# Patient Record
Sex: FEMALE | Race: OTHER | HISPANIC OR LATINO
[De-identification: names, ages, dates, MRNs, and addresses within clinical notes are randomized per-mention and may not be internally consistent; named-entity substitution may affect disease eponyms.]

---

## 2017-12-05 ENCOUNTER — OTHER (OUTPATIENT)
Age: 82
End: 2017-12-05

## 2017-12-12 ENCOUNTER — APPOINTMENT (OUTPATIENT)
Dept: ORTHOPEDIC SURGERY | Facility: CLINIC | Age: 82
End: 2017-12-12
Payer: MEDICARE

## 2017-12-12 VITALS
DIASTOLIC BLOOD PRESSURE: 75 MMHG | HEIGHT: 61 IN | WEIGHT: 148 LBS | BODY MASS INDEX: 27.94 KG/M2 | HEART RATE: 71 BPM | SYSTOLIC BLOOD PRESSURE: 144 MMHG

## 2017-12-12 DIAGNOSIS — Z78.9 OTHER SPECIFIED HEALTH STATUS: ICD-10-CM

## 2017-12-12 DIAGNOSIS — Z86.79 PERSONAL HISTORY OF OTHER DISEASES OF THE CIRCULATORY SYSTEM: ICD-10-CM

## 2017-12-12 PROCEDURE — 73564 X-RAY EXAM KNEE 4 OR MORE: CPT | Mod: 50

## 2017-12-12 PROCEDURE — 20610 DRAIN/INJ JOINT/BURSA W/O US: CPT | Mod: 50

## 2017-12-12 PROCEDURE — 99203 OFFICE O/P NEW LOW 30 MIN: CPT | Mod: 25

## 2018-03-14 ENCOUNTER — OTHER (OUTPATIENT)
Age: 83
End: 2018-03-14

## 2018-03-20 ENCOUNTER — APPOINTMENT (OUTPATIENT)
Dept: ORTHOPEDIC SURGERY | Facility: CLINIC | Age: 83
End: 2018-03-20
Payer: MEDICARE

## 2018-03-20 VITALS
HEART RATE: 85 BPM | DIASTOLIC BLOOD PRESSURE: 74 MMHG | BODY MASS INDEX: 27.94 KG/M2 | WEIGHT: 148 LBS | SYSTOLIC BLOOD PRESSURE: 130 MMHG | HEIGHT: 61 IN

## 2018-03-20 PROCEDURE — 73564 X-RAY EXAM KNEE 4 OR MORE: CPT | Mod: 50

## 2018-03-20 PROCEDURE — 99213 OFFICE O/P EST LOW 20 MIN: CPT

## 2018-05-22 ENCOUNTER — APPOINTMENT (OUTPATIENT)
Dept: ORTHOPEDIC SURGERY | Facility: CLINIC | Age: 83
End: 2018-05-22
Payer: MEDICARE

## 2018-05-22 VITALS
HEART RATE: 69 BPM | HEIGHT: 61 IN | SYSTOLIC BLOOD PRESSURE: 110 MMHG | BODY MASS INDEX: 27.94 KG/M2 | WEIGHT: 148 LBS | DIASTOLIC BLOOD PRESSURE: 59 MMHG

## 2018-05-22 PROCEDURE — 99213 OFFICE O/P EST LOW 20 MIN: CPT | Mod: 25

## 2018-05-22 PROCEDURE — 20610 DRAIN/INJ JOINT/BURSA W/O US: CPT | Mod: 50

## 2018-05-29 ENCOUNTER — APPOINTMENT (OUTPATIENT)
Dept: ORTHOPEDIC SURGERY | Facility: CLINIC | Age: 83
End: 2018-05-29
Payer: MEDICARE

## 2018-05-29 VITALS
SYSTOLIC BLOOD PRESSURE: 154 MMHG | HEART RATE: 67 BPM | WEIGHT: 148 LBS | HEIGHT: 61 IN | DIASTOLIC BLOOD PRESSURE: 80 MMHG | BODY MASS INDEX: 27.94 KG/M2

## 2018-05-29 PROCEDURE — 20610 DRAIN/INJ JOINT/BURSA W/O US: CPT | Mod: 50

## 2018-06-04 ENCOUNTER — APPOINTMENT (OUTPATIENT)
Dept: ORTHOPEDIC SURGERY | Facility: CLINIC | Age: 83
End: 2018-06-04
Payer: MEDICARE

## 2018-06-04 VITALS
DIASTOLIC BLOOD PRESSURE: 81 MMHG | SYSTOLIC BLOOD PRESSURE: 149 MMHG | BODY MASS INDEX: 27.94 KG/M2 | HEART RATE: 79 BPM | WEIGHT: 148 LBS | HEIGHT: 61 IN

## 2018-06-04 PROCEDURE — 20610 DRAIN/INJ JOINT/BURSA W/O US: CPT | Mod: 50

## 2018-07-17 ENCOUNTER — OTHER (OUTPATIENT)
Age: 83
End: 2018-07-17

## 2018-07-22 PROBLEM — Z78.9 ALCOHOL USE: Status: INACTIVE | Noted: 2017-12-12

## 2018-07-23 ENCOUNTER — APPOINTMENT (OUTPATIENT)
Dept: ORTHOPEDIC SURGERY | Facility: CLINIC | Age: 83
End: 2018-07-23
Payer: MEDICARE

## 2018-07-23 VITALS
WEIGHT: 148 LBS | HEIGHT: 61 IN | DIASTOLIC BLOOD PRESSURE: 79 MMHG | BODY MASS INDEX: 27.94 KG/M2 | HEART RATE: 74 BPM | SYSTOLIC BLOOD PRESSURE: 155 MMHG

## 2018-07-23 PROCEDURE — 73564 X-RAY EXAM KNEE 4 OR MORE: CPT | Mod: 50

## 2018-07-23 PROCEDURE — 99213 OFFICE O/P EST LOW 20 MIN: CPT | Mod: 25

## 2018-07-23 PROCEDURE — 20610 DRAIN/INJ JOINT/BURSA W/O US: CPT | Mod: RT

## 2018-07-23 RX ORDER — HYALURONATE SODIUM 20 MG/2 ML
20 SYRINGE (ML) INTRAARTICULAR
Qty: 12 | Refills: 0 | Status: DISCONTINUED | OUTPATIENT
Start: 2018-03-20 | End: 2018-07-23

## 2018-11-21 ENCOUNTER — APPOINTMENT (OUTPATIENT)
Dept: ORTHOPEDIC SURGERY | Facility: CLINIC | Age: 83
End: 2018-11-21
Payer: MEDICARE

## 2018-11-21 DIAGNOSIS — M25.562 PAIN IN RIGHT KNEE: ICD-10-CM

## 2018-11-21 DIAGNOSIS — M17.11 UNILATERAL PRIMARY OSTEOARTHRITIS, RIGHT KNEE: ICD-10-CM

## 2018-11-21 DIAGNOSIS — M17.12 UNILATERAL PRIMARY OSTEOARTHRITIS, LEFT KNEE: ICD-10-CM

## 2018-11-21 DIAGNOSIS — M25.561 PAIN IN RIGHT KNEE: ICD-10-CM

## 2018-11-21 PROCEDURE — 73562 X-RAY EXAM OF KNEE 3: CPT | Mod: RT

## 2018-11-21 PROCEDURE — 99213 OFFICE O/P EST LOW 20 MIN: CPT | Mod: 25

## 2018-11-21 PROCEDURE — 99214 OFFICE O/P EST MOD 30 MIN: CPT | Mod: 25

## 2018-11-21 PROCEDURE — 20610 DRAIN/INJ JOINT/BURSA W/O US: CPT | Mod: RT

## 2019-03-21 ENCOUNTER — APPOINTMENT (OUTPATIENT)
Dept: CARDIOLOGY | Facility: CLINIC | Age: 84
End: 2019-03-21
Payer: MEDICARE

## 2019-03-21 VITALS
OXYGEN SATURATION: 92 % | RESPIRATION RATE: 16 BRPM | BODY MASS INDEX: 28.89 KG/M2 | DIASTOLIC BLOOD PRESSURE: 55 MMHG | HEIGHT: 62 IN | WEIGHT: 157 LBS | HEART RATE: 16 BPM | SYSTOLIC BLOOD PRESSURE: 111 MMHG

## 2019-03-21 PROCEDURE — 93000 ELECTROCARDIOGRAM COMPLETE: CPT

## 2019-03-21 PROCEDURE — 99214 OFFICE O/P EST MOD 30 MIN: CPT

## 2019-03-21 RX ORDER — ASPIRIN AND DIPYRIDAMOLE 25; 200 MG/1; MG/1
CAPSULE, EXTENDED RELEASE ORAL
Refills: 0 | Status: DISCONTINUED | COMMUNITY
End: 2019-03-21

## 2019-03-21 RX ORDER — MEMANTINE HYDROCHLORIDE 5 MG/1
TABLET ORAL
Refills: 0 | Status: DISCONTINUED | COMMUNITY
End: 2019-03-21

## 2019-04-01 NOTE — DISCUSSION/SUMMARY
[FreeTextEntry1] : Case and plan discussed with Dr. Vaughan.\par \par 1 - Status post [facility] ER visit for acute diastolic HF:  presently without complaints of further shortness of breath.  Will continue with current diuretic therapy.  Educated on the importance of following a strict low sodium diet.  Labs prior to next visit.\par \par 2 - Hypertension:  Blood pressure well controlled on current medications.\par \par 3 - Will schedule Mrs. Mcnamara for an echocardiogram and pharmacologic nuclear stress test given her recent CHF exacerbation.\par \par 4 - Follow up with Dr. Taylor once testing is completed.

## 2019-04-01 NOTE — PHYSICAL EXAM
[General Appearance - Well Developed] : well developed [Normal Conjunctiva] : the conjunctiva exhibited no abnormalities [Normal Oral Mucosa] : normal oral mucosa [] : no respiratory distress [Auscultation Breath Sounds / Voice Sounds] : lungs were clear to auscultation bilaterally [Heart Rate And Rhythm] : heart rate and rhythm were normal [Heart Sounds] : normal S1 and S2 [Bowel Sounds] : normal bowel sounds [Skin Color & Pigmentation] : normal skin color and pigmentation [Skin Turgor] : normal skin turgor [Oriented To Time, Place, And Person] : oriented to person, place, and time [Affect] : the affect was normal [Mood] : the mood was normal [FreeTextEntry1] : Trace to 1+ bilateral LE edema

## 2019-04-01 NOTE — HISTORY OF PRESENT ILLNESS
[FreeTextEntry1] : Mrs. Mcnamara presents today for follow up status post a recent [facility] ER visit for complaints of increasing shortness of breath.  Patient with acute diastolic CHF most likely from non-compliance with her low sodium intake.  While at [facility] she was treated with IV diuretics and was ruled out for an MI and PE.  LE duplex was also negative for DVT.  Presently she is feeling well.  Denies any complaints of chest pain, shortness of breath, palpitations, lightheadedness or syncope.  She does complain of extreme fatigue.  Her family states that she frequently nods off and sleeps all day.  Admits she is unsteady on her feet.  Recenly lost her balance and fell at home.  She also has a recent onset of hand tremors.  She is scheduled to see Dr. Echevarria (neuro) next week.

## 2019-04-01 NOTE — REASON FOR VISIT
[FreeTextEntry1] : The patient is an 88 y.o.  female who presents today for follow up status post a recent [facility] ER visit.

## 2019-04-03 ENCOUNTER — APPOINTMENT (OUTPATIENT)
Dept: CARDIOLOGY | Facility: CLINIC | Age: 84
End: 2019-04-03
Payer: MEDICARE

## 2019-04-03 PROCEDURE — 93306 TTE W/DOPPLER COMPLETE: CPT

## 2019-04-15 ENCOUNTER — APPOINTMENT (OUTPATIENT)
Dept: CARDIOLOGY | Facility: CLINIC | Age: 84
End: 2019-04-15
Payer: MEDICARE

## 2019-04-15 PROCEDURE — 93015 CV STRESS TEST SUPVJ I&R: CPT

## 2019-04-15 PROCEDURE — A9500: CPT

## 2019-04-15 PROCEDURE — 78452 HT MUSCLE IMAGE SPECT MULT: CPT

## 2019-04-15 RX ORDER — REGADENOSON 0.08 MG/ML
0.4 INJECTION, SOLUTION INTRAVENOUS
Qty: 4 | Refills: 0 | Status: COMPLETED | OUTPATIENT
Start: 2019-04-15

## 2019-04-15 RX ORDER — AMINOPHYLLINE 25 MG/ML
25 INJECTION, SOLUTION INTRAVENOUS
Qty: 0 | Refills: 0 | Status: COMPLETED | OUTPATIENT
Start: 2019-04-15

## 2019-04-15 RX ADMIN — REGADENOSON 0 MG/5ML: 0.08 INJECTION, SOLUTION INTRAVENOUS at 00:00

## 2019-04-15 RX ADMIN — AMINOPHYLLINE 0 MG/ML: 25 INJECTION, SOLUTION INTRAVENOUS at 00:00

## 2019-05-03 RX ORDER — KIT FOR THE PREPARATION OF TECHNETIUM TC99M SESTAMIBI 1 MG/5ML
INJECTION, POWDER, LYOPHILIZED, FOR SOLUTION PARENTERAL
Refills: 0 | Status: COMPLETED | OUTPATIENT
Start: 2019-05-03

## 2019-05-03 RX ADMIN — KIT FOR THE PREPARATION OF TECHNETIUM TC99M SESTAMIBI 0: 1 INJECTION, POWDER, LYOPHILIZED, FOR SOLUTION PARENTERAL at 00:00

## 2019-05-13 ENCOUNTER — NON-APPOINTMENT (OUTPATIENT)
Age: 84
End: 2019-05-13

## 2019-05-13 ENCOUNTER — APPOINTMENT (OUTPATIENT)
Dept: CARDIOLOGY | Facility: CLINIC | Age: 84
End: 2019-05-13
Payer: MEDICARE

## 2019-05-13 VITALS
HEART RATE: 62 BPM | OXYGEN SATURATION: 90 % | DIASTOLIC BLOOD PRESSURE: 70 MMHG | RESPIRATION RATE: 14 BRPM | BODY MASS INDEX: 29.08 KG/M2 | SYSTOLIC BLOOD PRESSURE: 110 MMHG | WEIGHT: 158 LBS | HEIGHT: 62 IN

## 2019-05-13 DIAGNOSIS — R10.9 UNSPECIFIED ABDOMINAL PAIN: ICD-10-CM

## 2019-05-13 PROCEDURE — 93000 ELECTROCARDIOGRAM COMPLETE: CPT

## 2019-05-13 PROCEDURE — 99214 OFFICE O/P EST MOD 30 MIN: CPT

## 2019-05-13 RX ORDER — RAMIPRIL 5 MG/1
5 CAPSULE ORAL
Qty: 90 | Refills: 0 | Status: DISCONTINUED | COMMUNITY
End: 2019-05-13

## 2019-05-14 ENCOUNTER — RX RENEWAL (OUTPATIENT)
Age: 84
End: 2019-05-14

## 2019-05-14 RX ORDER — RANOLAZINE 1000 MG/1
1000 TABLET, EXTENDED RELEASE ORAL
Qty: 180 | Refills: 3 | Status: DISCONTINUED | COMMUNITY
End: 2019-05-14

## 2019-05-15 NOTE — HISTORY OF PRESENT ILLNESS
[FreeTextEntry1] : Mrs. Mcnamara was recently admitted to [facility] with complaints of shortness of breath and found to be in heart failure.  She was treated and subsequently released to undergo an outpatient evaluation.  At this time, she offers no complaints of chest pain, shortness of breath, or other cardiac symptoms.  She is using home oxygen on a regular basis.  Her other complaint today is that of flank pain.

## 2019-05-15 NOTE — PHYSICAL EXAM
[General Appearance - Well Developed] : well developed [General Appearance - In No Acute Distress] : no acute distress [Normal Conjunctiva] : the conjunctiva exhibited no abnormalities [Normal Oral Mucosa] : normal oral mucosa [Auscultation Breath Sounds / Voice Sounds] : lungs were clear to auscultation bilaterally [Heart Rate And Rhythm] : heart rate and rhythm were normal [Heart Sounds] : normal S1 and S2 [Bowel Sounds] : normal bowel sounds [Abnormal Walk] : normal gait [Skin Color & Pigmentation] : normal skin color and pigmentation [Skin Turgor] : normal skin turgor [Oriented To Time, Place, And Person] : oriented to person, place, and time [Affect] : the affect was normal [Mood] : the mood was normal [FreeTextEntry1] : trace edema

## 2019-05-15 NOTE — REASON FOR VISIT
[FreeTextEntry1] : Mrs. Mcnamara is a pleasant 88-year-old  female with a past medical history significant for hypertension, hypercholesterolemia, TIA and diastolic dysfunction with associated heart failure who presents for cardiac evaluation.

## 2019-05-15 NOTE — ASSESSMENT
[FreeTextEntry1] : 1.  EKG today reveals sinus rhythm at 62 bpm.  1o AV block.  APC.  No acute ischemic changes.  \par 2.  Diastolic dysfunction:  Patient with history of CHF secondary to diastolic dysfunction.  Echocardiography reveals normal left ventricular dimensions with hyperdynamic wall motion with ejection fraction exceeding 70%.  There are no significant valvular abnormalities.  \par 3.  A nuclear stress test utilizing IV Regadenoson revealed a small area of mild apical inferolateral ischemia.  This appears to be less when compared to prior study.  Left ventricular function hyperdynamic here as well.  Will increase Ranexa from 500 b.i.d. to 1000 mg b.i.d.  Patient will continue with her other medications and follow a low-salt diet. \par 4.  Flank pain:  Patient may have flank pain secondary to current use of ACE inhibitor.  Given her low blood pressure will have them hold Ramipril.  Will reassess when patient returns for further evaluation.    \par

## 2019-05-23 ENCOUNTER — NON-APPOINTMENT (OUTPATIENT)
Age: 84
End: 2019-05-23

## 2019-05-23 ENCOUNTER — APPOINTMENT (OUTPATIENT)
Dept: CARDIOLOGY | Facility: CLINIC | Age: 84
End: 2019-05-23
Payer: MEDICARE

## 2019-05-23 VITALS
SYSTOLIC BLOOD PRESSURE: 108 MMHG | WEIGHT: 158 LBS | BODY MASS INDEX: 29.08 KG/M2 | HEIGHT: 62 IN | DIASTOLIC BLOOD PRESSURE: 68 MMHG | RESPIRATION RATE: 14 BRPM | HEART RATE: 70 BPM

## 2019-05-23 PROCEDURE — 93000 ELECTROCARDIOGRAM COMPLETE: CPT

## 2019-05-23 PROCEDURE — 99214 OFFICE O/P EST MOD 30 MIN: CPT

## 2019-05-23 RX ORDER — ROPINIROLE 2 MG/1
2 TABLET, FILM COATED ORAL
Refills: 0 | Status: DISCONTINUED | COMMUNITY
End: 2019-05-23

## 2019-05-23 RX ORDER — FUROSEMIDE 40 MG/1
40 TABLET ORAL DAILY
Qty: 1 | Refills: 3 | Status: DISCONTINUED | COMMUNITY
End: 2019-05-23

## 2019-05-23 RX ORDER — RANOLAZINE 500 MG/1
500 TABLET, EXTENDED RELEASE ORAL
Qty: 360 | Refills: 1 | Status: DISCONTINUED | COMMUNITY
End: 2019-05-23

## 2019-05-24 NOTE — HISTORY OF PRESENT ILLNESS
[FreeTextEntry1] : Mrs. Mcnamara present today without complaints of chest pain, shortness of breath, or lightheadedness.  While at [facility], she has her Ranexa reduced from 1000 mg b.i.d. to 500 mg b.i.d. as well as had her diuretic and Enalapril discontinued.  At this point, she offers no specific cardiac-related complaints.

## 2019-05-24 NOTE — REASON FOR VISIT
[FreeTextEntry1] : Mrs. Mcnamara is a pleasant 88-year-old  female with a past medical history significant for hypertension, hyperlipidemia, TIAs, and diastolic dysfunction associated with heart failure who recently was admitted to [facility] with “dizziness.”

## 2019-05-24 NOTE — ASSESSMENT
[FreeTextEntry1] : 1.  EKG today reveals sinus rhythm at 70 bpm.  Normal intervals.  Poor R-wave progression across precordial leads V1 through V4.  No ischemic changes. \par 2.  Dizziness:  Patient without further complaints.  May have been dehydrated at that time.  Currently off of diuretic therapy which I suspect she will ultimately need.  At this point, will reduce her Metoprolol tartrate to 25 mg b.i.d. and have advised patient’s family to weigh her on a regular basis.  Should weight go up, we will resume her diuretic therapy possibly at 20 mg daily.  If clinically stable, will see her in again in 2-3 months.    \par

## 2019-06-06 ENCOUNTER — TRANSCRIPTION ENCOUNTER (OUTPATIENT)
Age: 84
End: 2019-06-06

## 2019-06-24 ENCOUNTER — APPOINTMENT (OUTPATIENT)
Dept: CARDIOLOGY | Facility: CLINIC | Age: 84
End: 2019-06-24

## 2019-09-04 ENCOUNTER — APPOINTMENT (OUTPATIENT)
Dept: CARDIOLOGY | Facility: CLINIC | Age: 84
End: 2019-09-04
Payer: MEDICARE

## 2019-09-04 ENCOUNTER — NON-APPOINTMENT (OUTPATIENT)
Age: 84
End: 2019-09-04

## 2019-09-04 VITALS
HEIGHT: 59 IN | DIASTOLIC BLOOD PRESSURE: 58 MMHG | SYSTOLIC BLOOD PRESSURE: 118 MMHG | BODY MASS INDEX: 32.86 KG/M2 | WEIGHT: 163 LBS | HEART RATE: 73 BPM

## 2019-09-04 DIAGNOSIS — R42 DIZZINESS AND GIDDINESS: ICD-10-CM

## 2019-09-04 PROCEDURE — 99214 OFFICE O/P EST MOD 30 MIN: CPT

## 2019-09-04 PROCEDURE — 93000 ELECTROCARDIOGRAM COMPLETE: CPT

## 2019-09-05 NOTE — HISTORY OF PRESENT ILLNESS
[FreeTextEntry1] : From a cardiac standpoint, Mrs. Mcnamara denies exertional chest pain or shortness of breath.  She remains dizzy by recent changes in medications.  I suspect that some of this may well be middle ear given the fact that she describes a vertiginous component to her dizziness.

## 2019-09-05 NOTE — REASON FOR VISIT
[FreeTextEntry1] : Mrs. Mcnamara is a pleasant 88-year-old  female with a past medical history significant for hypertension, hyperlipidemia, TIAs, congestive heart failure secondary to diastolic dysfunction, and recent dizziness, who presents for follow up evaluation.

## 2019-09-10 ENCOUNTER — INBOUND DOCUMENT (OUTPATIENT)
Age: 84
End: 2019-09-10

## 2019-12-16 ENCOUNTER — APPOINTMENT (OUTPATIENT)
Dept: CARDIOLOGY | Facility: CLINIC | Age: 84
End: 2019-12-16
Payer: MEDICARE

## 2019-12-16 VITALS
WEIGHT: 166 LBS | HEIGHT: 59 IN | BODY MASS INDEX: 33.47 KG/M2 | HEART RATE: 84 BPM | SYSTOLIC BLOOD PRESSURE: 112 MMHG | DIASTOLIC BLOOD PRESSURE: 62 MMHG | RESPIRATION RATE: 14 BRPM

## 2019-12-16 DIAGNOSIS — R42 DIZZINESS AND GIDDINESS: ICD-10-CM

## 2019-12-16 PROCEDURE — 99214 OFFICE O/P EST MOD 30 MIN: CPT

## 2019-12-16 PROCEDURE — 93000 ELECTROCARDIOGRAM COMPLETE: CPT

## 2019-12-17 NOTE — ASSESSMENT
[FreeTextEntry1] :  1.  EKG today reveals sinus rhythm at 84 bpm.  Two PVCs.  Poor R-wave progression leads V1 through V3.  No acute ischemic changes.   2.  Peripheral edema:  Patient with known peripheral edema, left greater than right.  Will consolidate current Furosemide therapy from 20 mg b.i.d. to 40 mg q. a.m.  10 mEq of potassium supplementation given as well.  Patient will undergo follow up blood work in approximately four weeks with an office visit thereafter.  3.  Hyperlipidemia:  Review of recent lipid profile reveals a total cholesterol of 142, HDL 42, TC/HDL ratio 3.4, LDL 66, triglycerides 247.  Patient advised on a stricter low-carbohydrate diet.  Her hemoglobin A1C is 6.2.  Patient will follow up with primary care provider.

## 2019-12-17 NOTE — HISTORY OF PRESENT ILLNESS
[FreeTextEntry1] :  From a cardiac standpoint, Mrs. Mcnamara remains reasonably stable denying exertional chest pain or shortness of breath.  She states she has developed some swelling of her lower extremities.  Her primary care provider recently augmented Furosemide from 20 mg q.d. to 20 mg b.i.d.  No potassium supplementation was given at the time.  Most recent potassium level 4.4.

## 2019-12-17 NOTE — PHYSICAL EXAM
[General Appearance - In No Acute Distress] : no acute distress [General Appearance - Well Developed] : well developed [Normal Conjunctiva] : the conjunctiva exhibited no abnormalities [Normal Oral Mucosa] : normal oral mucosa [Auscultation Breath Sounds / Voice Sounds] : lungs were clear to auscultation bilaterally [Heart Rate And Rhythm] : heart rate and rhythm were normal [Heart Sounds] : normal S1 and S2 [Bowel Sounds] : normal bowel sounds [Abnormal Walk] : normal gait [Skin Turgor] : normal skin turgor [Skin Color & Pigmentation] : normal skin color and pigmentation [Oriented To Time, Place, And Person] : oriented to person, place, and time [Mood] : the mood was normal [Affect] : the affect was normal [FreeTextEntry1] : trace edema

## 2019-12-17 NOTE — REASON FOR VISIT
[FreeTextEntry1] : Mrs. Mcnamara is a pleasant 89-year-old  female with a past medical history significant for hypertension, hyperlipidemia, and congestive heart failure secondary to diastolic dysfunction, TIAs, who presents for follow up evaluation. \par

## 2020-02-24 ENCOUNTER — APPOINTMENT (OUTPATIENT)
Dept: CARDIOLOGY | Facility: CLINIC | Age: 85
End: 2020-02-24
Payer: MEDICARE

## 2020-02-24 VITALS
HEART RATE: 65 BPM | BODY MASS INDEX: 33.47 KG/M2 | RESPIRATION RATE: 14 BRPM | HEIGHT: 59 IN | DIASTOLIC BLOOD PRESSURE: 62 MMHG | SYSTOLIC BLOOD PRESSURE: 115 MMHG | WEIGHT: 166 LBS

## 2020-02-24 PROCEDURE — 99214 OFFICE O/P EST MOD 30 MIN: CPT

## 2020-02-24 PROCEDURE — 93000 ELECTROCARDIOGRAM COMPLETE: CPT

## 2020-02-24 RX ORDER — POTASSIUM CHLORIDE 750 MG/1
10 CAPSULE, EXTENDED RELEASE ORAL
Qty: 90 | Refills: 1 | Status: DISCONTINUED | COMMUNITY
Start: 2019-12-16 | End: 2020-02-24

## 2020-02-24 RX ORDER — FENOFIBRATE 48 MG/1
48 TABLET ORAL DAILY
Qty: 30 | Refills: 2 | Status: DISCONTINUED | COMMUNITY
End: 2020-02-24

## 2020-02-25 NOTE — PHYSICAL EXAM
[General Appearance - Well Developed] : well developed [Normal Conjunctiva] : the conjunctiva exhibited no abnormalities [General Appearance - In No Acute Distress] : no acute distress [Normal Oral Mucosa] : normal oral mucosa [Auscultation Breath Sounds / Voice Sounds] : lungs were clear to auscultation bilaterally [Heart Sounds] : normal S1 and S2 [Heart Rate And Rhythm] : heart rate and rhythm were normal [Bowel Sounds] : normal bowel sounds [Abnormal Walk] : normal gait [Skin Turgor] : normal skin turgor [Skin Color & Pigmentation] : normal skin color and pigmentation [Mood] : the mood was normal [Oriented To Time, Place, And Person] : oriented to person, place, and time [Affect] : the affect was normal [FreeTextEntry1] : 1-2+ edema B/L

## 2020-02-25 NOTE — ASSESSMENT
[FreeTextEntry1] : 1.  EKG today reveals normal sinus rhythm at 65 bpm.  Poor R-wave progression lead V1 through V4.  No acute ischemic changes.   2.  Hypertension:  Blood pressure well controlled at this time on current medications.    3.  Peripheral edema:  Patient with noted peripheral edema at this point.  Will add Spironolactone on an every-other-day basis to daily Furosemide therapy.  May discontinue potassium supplementation.  BNP level 38.  Patient will undergo follow up bloodwork and an office visit within the next four weeks.   4.  In addition to the above, the patient is advised on good hydration and salt abstinence.

## 2020-02-25 NOTE — HISTORY OF PRESENT ILLNESS
[FreeTextEntry1] : From a cardiac standpoint, Mrs. Mcnamara denies exertional chest pain, shortness of breath, or other cardiac symptoms.  She does complain about peripheral edema at this time.

## 2020-02-25 NOTE — REASON FOR VISIT
[FreeTextEntry1] : Mrs. Mcnamara is an 89-year-old  female with a past medical history significant for hypertension, hyperlipidemia, congestive heart failure secondary to diastolic dysfunction, and TIAs who presents for follow up evaluation.  \par \par \par \par

## 2020-03-18 ENCOUNTER — APPOINTMENT (OUTPATIENT)
Dept: CARDIOLOGY | Facility: CLINIC | Age: 85
End: 2020-03-18

## 2020-05-08 ENCOUNTER — APPOINTMENT (OUTPATIENT)
Dept: CARDIOLOGY | Facility: CLINIC | Age: 85
End: 2020-05-08

## 2020-05-11 ENCOUNTER — RX RENEWAL (OUTPATIENT)
Age: 85
End: 2020-05-11

## 2020-05-13 ENCOUNTER — APPOINTMENT (OUTPATIENT)
Dept: CARDIOLOGY | Facility: CLINIC | Age: 85
End: 2020-05-13
Payer: MEDICARE

## 2020-05-13 VITALS
DIASTOLIC BLOOD PRESSURE: 80 MMHG | HEIGHT: 59 IN | WEIGHT: 172 LBS | SYSTOLIC BLOOD PRESSURE: 140 MMHG | BODY MASS INDEX: 34.68 KG/M2

## 2020-05-13 DIAGNOSIS — Z86.39 PERSONAL HISTORY OF OTHER ENDOCRINE, NUTRITIONAL AND METABOLIC DISEASE: ICD-10-CM

## 2020-05-13 PROCEDURE — 99214 OFFICE O/P EST MOD 30 MIN: CPT | Mod: 95

## 2020-05-13 NOTE — HISTORY OF PRESENT ILLNESS
[Home] : at home, [unfilled] , at the time of the visit. [Other:____] : [unfilled] [Medical Office: ([facility])___] : at the medical office located in  [FreeTextEntry1] : Patient requested a TELEHEALTH contact at this time to discuss specific cardiovascular issues and associated risk factors. This contact took place via TELEHEALTH link utilizing [employer] software.  Consent was obtained from the patient in advance of this communication.  The consent form can be found in the "OTHER CONSENTS" section of the patient's medical record.  Time spent of this communication was approximately:\par \par Presently Mrs. Mcnamara states that she has been feeling well.  Denies complaints of chest pain, shortness of breath, palpitations, lightheadedness or syncope.  Her son states that she is not very compliant with her low sodium diet, as she loves to eat cheese and pork.  She is up 6 pounds since her visit in late February.  She still has some mild edema despite the addition of spironolactone every other day to her furosemide 40mg daily.

## 2020-05-13 NOTE — PHYSICAL EXAM
[Oriented To Time, Place, And Person] : oriented to person, place, and time [General Appearance - In No Acute Distress] : no acute distress [General Appearance - Well Developed] : well developed [Affect] : the affect was normal [Mood] : the mood was normal

## 2020-05-13 NOTE — REASON FOR VISIT
[FreeTextEntry1] : The patient is an 89 year-old  female with a past medical history significant for hypertension, hyperlipidemia, congestive heart failure secondary to diastolic dysfunction, and TIAs.

## 2020-05-13 NOTE — DISCUSSION/SUMMARY
[FreeTextEntry1] : 1 - Hypertension:  blood pressure borderline controlled.  Advised the patient and her family to follow a strict low sodium diet and weight loss.\par \par 2 - Peripheral edema:  as per the son, Ange still with mild edema.  Again reminded them the importance of following a strict low sodium diet.  Will continue with current diuretic therapy.  BUN 27, creatinine 1.17, potassium 4.9.  Repeat labs in 2 months.\par \par 3 - Hypercholesterolemia:  cholesterol 151, HDL 42, LDL 78, triglycerides 214.  Patient will continue with atorvastatin 20mg daily, follow low fat, low cholesterol, low carbohydrate diet.  Fasting blood work in 2 months.\par \par 4 - Reviewed COVID-19 signs/symptoms and precautions.\par \par 5 - Follow up in 3-4 months.

## 2020-06-08 ENCOUNTER — RX RENEWAL (OUTPATIENT)
Age: 85
End: 2020-06-08

## 2020-06-15 ENCOUNTER — RX RENEWAL (OUTPATIENT)
Age: 85
End: 2020-06-15

## 2020-07-10 ENCOUNTER — RX RENEWAL (OUTPATIENT)
Age: 85
End: 2020-07-10

## 2020-08-12 ENCOUNTER — RX RENEWAL (OUTPATIENT)
Age: 85
End: 2020-08-12

## 2020-08-26 ENCOUNTER — RX RENEWAL (OUTPATIENT)
Age: 85
End: 2020-08-26

## 2020-09-08 ENCOUNTER — APPOINTMENT (OUTPATIENT)
Dept: CARDIOLOGY | Facility: CLINIC | Age: 85
End: 2020-09-08
Payer: MEDICARE

## 2020-09-08 PROCEDURE — ZZZZZ: CPT

## 2020-09-10 ENCOUNTER — APPOINTMENT (OUTPATIENT)
Dept: CARDIOLOGY | Facility: CLINIC | Age: 85
End: 2020-09-10

## 2020-09-28 ENCOUNTER — NON-APPOINTMENT (OUTPATIENT)
Age: 85
End: 2020-09-28

## 2020-09-28 ENCOUNTER — APPOINTMENT (OUTPATIENT)
Dept: CARDIOLOGY | Facility: CLINIC | Age: 85
End: 2020-09-28
Payer: MEDICARE

## 2020-09-28 VITALS
BODY MASS INDEX: 33.87 KG/M2 | SYSTOLIC BLOOD PRESSURE: 120 MMHG | TEMPERATURE: 98 F | RESPIRATION RATE: 15 BRPM | WEIGHT: 168 LBS | DIASTOLIC BLOOD PRESSURE: 70 MMHG | HEART RATE: 71 BPM | HEIGHT: 59 IN

## 2020-09-28 DIAGNOSIS — Z86.39 PERSONAL HISTORY OF OTHER ENDOCRINE, NUTRITIONAL AND METABOLIC DISEASE: ICD-10-CM

## 2020-09-28 PROCEDURE — 99214 OFFICE O/P EST MOD 30 MIN: CPT

## 2020-09-28 PROCEDURE — 93000 ELECTROCARDIOGRAM COMPLETE: CPT

## 2020-09-28 RX ORDER — ASPIRIN 325 MG/1
325 TABLET, FILM COATED ORAL DAILY
Qty: 30 | Refills: 2 | Status: DISCONTINUED | COMMUNITY
End: 2020-09-28

## 2020-09-28 NOTE — REASON FOR VISIT
[FreeTextEntry1] : The patient is an 89-year-old  female with a past medical history significant for hypertension, hyperlipidemia, congestive heart failure secondary to diastolic dysfunction and TIAs who presents for follow up evaluation.

## 2020-09-28 NOTE — HISTORY OF PRESENT ILLNESS
[FreeTextEntry1] : Mrs. Mcnamara presents today for follow up evaluation and results of her recent 24-hour holter monitor for tachycardia (as per her neurologist).  Presently she has complaint of chest discomfort with exertion.  Occasional shortness of breath.  Denies palpitations, lightheadedness or syncope.  Admits that she doesn't follow the best diet.  Needs to improve on her low sodium intake.

## 2020-09-28 NOTE — PHYSICAL EXAM
[General Appearance - Well Developed] : well developed [General Appearance - In No Acute Distress] : no acute distress [Normal Conjunctiva] : the conjunctiva exhibited no abnormalities [Normal Oral Mucosa] : normal oral mucosa [] : no respiratory distress [Auscultation Breath Sounds / Voice Sounds] : lungs were clear to auscultation bilaterally [Heart Rate And Rhythm] : heart rate and rhythm were normal [Heart Sounds] : normal S1 and S2 [Bowel Sounds] : normal bowel sounds [Skin Color & Pigmentation] : normal skin color and pigmentation [Skin Turgor] : normal skin turgor [Oriented To Time, Place, And Person] : oriented to person, place, and time [Affect] : the affect was normal [Mood] : the mood was normal [FreeTextEntry1] : Trace to 1+ bilateral LE edema.  Very sensitive to touch

## 2020-09-28 NOTE — DISCUSSION/SUMMARY
[FreeTextEntry1] : Dr. Taylor in to speak with the patient and her son.\par \par 1 - Chest pain:  patient with complaints of chest pain with exertion and occasional shortness of breath.  Today's EKG reveals sinus rhtyhm with inverted T-waves in leads V1-V3 which are new.  Have advised the patient to increase Ranexa to 1000mg BID.  Will schedule the patient for a cardiac catheterization at [facility] with Dr. Pino as soon as possible.\par \par 2 - Tachycardia:  patient was found to be tachycardic by her neurologist.  Underwent 24-hour holter monitoring which revealed sinus rhythm, with average rate of 71 bpm (max 97, min 60).  No episodes of VT or SVT.  Unifocal PVCs (avg 2/hr).  There were a total of 6 PACS recorded.  No significant pauses or AV blocks.\par \par 3 - Peripheral edema:  patient with trace to 1+ bilateral LE edema.  She has again been instructed on the importance of following a strict low sodium diet.  Will continue with current diuretic therapy which includes furosemide 40mg daily and spironolactone 25mg Q.O.D.  Potassium 4.8, BUN 30, creatinine 1.29.\par \par 4 - Hypercholesterolemia:  cholesterol 150, HDL 39, LDL 55, triglycerides 281, TC/HDL 3.8.  Patient advised to continue with Atorvastatin 20mg daily and low fat, low cholesterol, low carbohydrate diet.\par \par 5 - Labs:  H/H 13.4/39.9, platelets 232, glucose 210,TSH 1.88, HgA1c 6.6.\par \par 6 - Follow up in 1 month.

## 2020-10-08 DIAGNOSIS — Z01.818 ENCOUNTER FOR OTHER PREPROCEDURAL EXAMINATION: ICD-10-CM

## 2020-10-09 ENCOUNTER — APPOINTMENT (OUTPATIENT)
Dept: DISASTER EMERGENCY | Facility: CLINIC | Age: 85
End: 2020-10-09

## 2020-10-19 ENCOUNTER — APPOINTMENT (OUTPATIENT)
Dept: CARDIOLOGY | Facility: CLINIC | Age: 85
End: 2020-10-19

## 2020-10-29 RX ORDER — RANOLAZINE 1000 MG/1
1000 TABLET, EXTENDED RELEASE ORAL
Qty: 180 | Refills: 1 | Status: DISCONTINUED | COMMUNITY
End: 2020-10-29

## 2020-11-04 ENCOUNTER — APPOINTMENT (OUTPATIENT)
Dept: CARDIOLOGY | Facility: CLINIC | Age: 85
End: 2020-11-04
Payer: MEDICARE

## 2020-11-04 VITALS
DIASTOLIC BLOOD PRESSURE: 64 MMHG | TEMPERATURE: 97.9 F | HEART RATE: 77 BPM | RESPIRATION RATE: 16 BRPM | SYSTOLIC BLOOD PRESSURE: 116 MMHG | HEIGHT: 59 IN | BODY MASS INDEX: 33.67 KG/M2 | WEIGHT: 167 LBS

## 2020-11-04 PROCEDURE — 99072 ADDL SUPL MATRL&STAF TM PHE: CPT

## 2020-11-04 PROCEDURE — 99214 OFFICE O/P EST MOD 30 MIN: CPT

## 2020-11-04 PROCEDURE — 93000 ELECTROCARDIOGRAM COMPLETE: CPT

## 2020-11-11 NOTE — HISTORY OF PRESENT ILLNESS
[FreeTextEntry1] : Mrs. Mcnamara presents today for follow up evaluation status-post a 2 recent [facility] admissions, for pneumonia, hypotension and altered mental status.  Head CT was negative for intracranial hemorrhage.  CT angio of neck demonstrated no larger vessel intracranial occulsion or high-grade stenosis.  No significant stenoses of the internal carotid arteries.  Patient was treated with antibiotics, IV fluid and changes were made to her anthypertensive medications.  Presently she is feeling better.  Denies complaints of chest pain, palpitations, lightheadedness or syncope.  She does continue to feel somewhat short of breath, but is slowly improving.  She also admits that she fatigues quite easily and is not very active.  Not very compliant with her low sodium diet.

## 2020-11-11 NOTE — DISCUSSION/SUMMARY
[FreeTextEntry1] : Dr. Sherman in to speak with the patient and her son.\par \par 1 - Patient status-post hospital admission for pneumonia, hypotension and altered mental status:  Presently patient feeling better.  Blood pressure well controlled with discontinuation of spironolactone and decrease in metoprolol.  Head CT was negative for intracranial bleed.  Mental status improved.\par \par 2 - Chest pain:  patient denies any further episodes of chest pain.  Does however continue with shortness of breath and fatigue which could well be related to her resolving pneumonia.  At this time, we will give Mrs. Mcnamara a couple of weeks to improve more and will revisit cardiac workup for her previous chest discomfort, as cardiac catheterization was postponed due to her recent hospitalizations.  Right now, her son would like to hold off on any further testing as well.  When she returns in 2-3 weeks, we may consider doing nuclear stress test for further evaluation.\par \par 3 - Follow up in 2-3 weeks.

## 2020-11-11 NOTE — REASON FOR VISIT
[FreeTextEntry1] : The patient is an 89-year-old  female with a past medical history significant for hypertension, hyperlipidemia, congestive heart failure secondary to diastolic dysfunction and TIAs who presents for follow up evaluation.

## 2020-11-12 ENCOUNTER — RX CHANGE (OUTPATIENT)
Age: 85
End: 2020-11-12

## 2020-12-14 ENCOUNTER — APPOINTMENT (OUTPATIENT)
Dept: CARDIOLOGY | Facility: CLINIC | Age: 85
End: 2020-12-14
Payer: MEDICARE

## 2020-12-14 DIAGNOSIS — R07.9 CHEST PAIN, UNSPECIFIED: ICD-10-CM

## 2020-12-14 PROCEDURE — 99443: CPT

## 2020-12-14 RX ORDER — METOPROLOL TARTRATE 25 MG/1
25 TABLET, FILM COATED ORAL
Qty: 180 | Refills: 0 | Status: DISCONTINUED | COMMUNITY
Start: 2020-11-06

## 2020-12-14 RX ORDER — GABAPENTIN 100 MG/1
100 CAPSULE ORAL
Qty: 30 | Refills: 0 | Status: DISCONTINUED | COMMUNITY
End: 2020-12-14

## 2020-12-14 RX ORDER — CHOLECALCIFEROL (VITAMIN D3) 125 MCG
TABLET ORAL DAILY
Refills: 0 | Status: DISCONTINUED | COMMUNITY
End: 2020-12-14

## 2020-12-14 RX ORDER — SPIRONOLACTONE 25 MG/1
25 TABLET ORAL
Qty: 45 | Refills: 3 | Status: DISCONTINUED | COMMUNITY
Start: 2020-02-24 | End: 2020-12-14

## 2020-12-14 RX ORDER — LEVETIRACETAM 500 MG/1
500 TABLET, FILM COATED ORAL
Qty: 60 | Refills: 0 | Status: DISCONTINUED | COMMUNITY
Start: 2020-09-15

## 2020-12-14 RX ORDER — ASPIRIN 325 MG/1
325 TABLET, COATED ORAL
Qty: 90 | Refills: 0 | Status: DISCONTINUED | COMMUNITY
Start: 2020-08-07

## 2020-12-14 RX ORDER — MEMANTINE HYDROCHLORIDE 21 MG/1
21 CAPSULE, EXTENDED RELEASE ORAL DAILY
Refills: 0 | Status: DISCONTINUED | COMMUNITY
End: 2020-12-14

## 2020-12-14 RX ORDER — FUROSEMIDE 20 MG/1
20 TABLET ORAL
Qty: 90 | Refills: 0 | Status: DISCONTINUED | COMMUNITY
Start: 2020-11-15

## 2020-12-14 RX ORDER — MEMANTINE HYDROCHLORIDE 28 MG/1
28 CAPSULE, EXTENDED RELEASE ORAL
Qty: 14 | Refills: 0 | Status: DISCONTINUED | COMMUNITY
Start: 2020-10-07

## 2020-12-14 RX ORDER — AZITHROMYCIN 250 MG/1
250 TABLET, FILM COATED ORAL
Qty: 7 | Refills: 0 | Status: DISCONTINUED | COMMUNITY
Start: 2020-10-07

## 2020-12-14 NOTE — HISTORY OF PRESENT ILLNESS
[Home] : at home, [unfilled] , at the time of the visit. [Medical Office: ([facility])___] : at the medical office located in  [Family Member] : family member [FreeTextEntry1] : Patient requested contact at this time to discuss specific issues and associated risk factors (see below).  This contact took place via telephone.  Consent was obtained from the patient in advance of this telephonic communication.  The consent form can be found in the "OTHER CONSENTS" section of the patient's medical record.  TIme spent on the telephone - 25 minutes\par \par I spoke with Mrs. Mcnamara's son today.  States that Ange has been feeling well for the most part.  Denies any further chest pain.  Does not have complaints of palpitations, lightheadedness or syncope.  She does have occasional mild shortness of breath, however, she has been on home O2 prescribed by her pulmonologist, although she does not always use it as she should.  She and her family say that their biggest concern is that of her fatigue and she is always sleeping, but they say that this has been going on for years and is nothing new.  She does sleep well at night.\par

## 2020-12-14 NOTE — DISCUSSION/SUMMARY
[FreeTextEntry1] : 1 - Hypertension:  blood pressure well controlled on current medications.  Home BP today 113/61, HR 75.  Advised to follow low sodium diet.\par \par 2 - Chest pain:  patient without further complaints of chest pain.  A few office visits back we had recommended cardiac catheterization as Mrs. Mcnamara was having chest discomfort as well as changes in her EKG.  The catheterization was cancelled as the patient had 2 hospital admissions for pneumonia, hypotension and altered mental status.  At this time, the family would like to wait until after the holidays to proceed with any further testing including cardiac catheterization.  I instructed them to go to the nearest emergency room should Mrs. Mcnamara begin to develop severe symptoms.  The son will call to make appointment after the holidays for follow up visit, where we will repeat EKG and reassess the patient and her symptoms.\par \par 3 - Follow up with Dr. Taylor in the beginning of the new year.

## 2021-03-15 ENCOUNTER — RX RENEWAL (OUTPATIENT)
Age: 86
End: 2021-03-15

## 2021-06-22 ENCOUNTER — NON-APPOINTMENT (OUTPATIENT)
Age: 86
End: 2021-06-22

## 2021-06-22 ENCOUNTER — APPOINTMENT (OUTPATIENT)
Dept: CARDIOLOGY | Facility: CLINIC | Age: 86
End: 2021-06-22
Payer: MEDICARE

## 2021-06-22 VITALS
TEMPERATURE: 98.7 F | BODY MASS INDEX: 33.26 KG/M2 | HEIGHT: 59 IN | HEART RATE: 81 BPM | RESPIRATION RATE: 18 BRPM | OXYGEN SATURATION: 80 % | WEIGHT: 165 LBS | SYSTOLIC BLOOD PRESSURE: 111 MMHG | DIASTOLIC BLOOD PRESSURE: 66 MMHG

## 2021-06-22 VITALS — OXYGEN SATURATION: 96 %

## 2021-06-22 DIAGNOSIS — Z86.79 PERSONAL HISTORY OF OTHER DISEASES OF THE CIRCULATORY SYSTEM: ICD-10-CM

## 2021-06-22 PROCEDURE — 99214 OFFICE O/P EST MOD 30 MIN: CPT

## 2021-06-22 PROCEDURE — 93000 ELECTROCARDIOGRAM COMPLETE: CPT

## 2021-06-22 RX ORDER — ALBUTEROL SULFATE 90 UG/1
108 (90 BASE) INHALANT RESPIRATORY (INHALATION)
Qty: 7 | Refills: 0 | Status: ACTIVE | COMMUNITY
Start: 2020-10-23

## 2021-06-22 RX ORDER — METOPROLOL TARTRATE 50 MG/1
50 TABLET, FILM COATED ORAL
Qty: 180 | Refills: 3 | Status: DISCONTINUED | COMMUNITY
End: 2021-06-22

## 2021-06-22 RX ORDER — ATORVASTATIN CALCIUM 20 MG/1
20 TABLET, FILM COATED ORAL
Qty: 30 | Refills: 3 | Status: DISCONTINUED | COMMUNITY
End: 2021-06-22

## 2021-06-22 RX ORDER — FUROSEMIDE 40 MG/1
40 TABLET ORAL DAILY
Qty: 90 | Refills: 1 | Status: DISCONTINUED | COMMUNITY
Start: 2019-12-16 | End: 2021-06-22

## 2021-06-22 NOTE — REASON FOR VISIT
[FreeTextEntry1] : The patient is a 89-year-old  female with a past medical history significant for hypertension, hyperlipidemia, congestive heart failure secondary to diastolic dysfunction and TIAs who present for follow up evaluation.

## 2021-06-22 NOTE — DISCUSSION/SUMMARY
[FreeTextEntry1] : Case and plan discussed with Dr. Taylor.\par \par 1 - Hypertension:  blood pressure well controlled on current medications.  Advised to follow low sodium diet\par \par 2 - Pneumonia:  patient status-post a recent hospitalization.  Recently finished course of antibiotics.  Presently on home O2 at 3L.  Mrs. Mcnamara will have a visiting doctor come to her house later this afternoon.\par \par 3 - Will schedule follow up echocardiogram.\par \par 4 - Today's EKG with T-wave inversions in I and aVL as well as V2-V6.  Patient is presently without any complaints of chest pain, shortness of breath, palpitations, lightheadedhess or syncope.  Will continue with Ranexa 500mg BID.\par \par 5 - Peripheral edema:  Mrs. Mcnamara with 1+ bilateral pedal edema.  Will increase furosemide to 40mg daily and will continue spironolactone 25mg every other day.  Labs in 3 weeks.\par \par 6 - Follow up in 4 weeks.

## 2021-06-22 NOTE — CARDIOLOGY SUMMARY
[de-identified] : Sinus rhythm at 81 bpm.  T-wave abnormalities in leads I and aVL (new) and V2-V6.

## 2021-06-22 NOTE — REVIEW OF SYSTEMS
[Chest Discomfort] : no chest discomfort [Feeling Fatigued] : feeling fatigued [Lower Ext Edema] : lower extremity edema [Negative] : Heme/Lymph

## 2021-06-22 NOTE — HISTORY OF PRESENT ILLNESS
[FreeTextEntry1] : Mrs. Mcnamara presents today for follow up evaluation status-post a recent [facility] admission for aspiration pneumonia.   The patient's son found her to be lethargic, confused, shaking with  her oxygen saturation to be 90% on 2L O2.  At the hospital, Brain CT showed no hemorrhage or hydrocephalus, mild involutional and small vessel ischemic changes.  CT of abdomen and pelvis revealed new bilateral lower lobe pneumonia, small bilateral pleural effusions.  Lower extremity duplex was negative for DVT.  The patient was treated with antibiotics and diuretics and discharged home.  She is presently feeling well.  Denies chest pain, shortness of breath, palpitations, lightheadedness or syncope.  She does have some bilateral pedal edema.

## 2021-06-22 NOTE — PHYSICAL EXAM
[Well Developed] : well developed [Well Nourished] : well nourished [No Acute Distress] : no acute distress [Obese] : obese [Normal Conjunctiva] : normal conjunctiva [Normal Venous Pressure] : normal venous pressure [No Carotid Bruit] : no carotid bruit [Normal S1, S2] : normal S1, S2 [No Rub] : no rub [No Gallop] : no gallop [No Respiratory Distress] : no respiratory distress  [Umm ___] : umm AvilaV [Soft] : abdomen soft [No Masses/organomegaly] : no masses/organomegaly [Normal Bowel Sounds] : normal bowel sounds [No Rash] : no rash [No Skin Lesions] : no skin lesions [Moves all extremities] : moves all extremities [No Focal Deficits] : no focal deficits [Normal Speech] : normal speech [Alert and Oriented] : alert and oriented [Normal memory] : normal memory [de-identified] : I-II/VI systolic murmur [de-identified] : B [de-identified] : in wheelchair [de-identified] : 1-2+ bilateral pedal edema

## 2021-07-14 ENCOUNTER — APPOINTMENT (OUTPATIENT)
Dept: CARDIOLOGY | Facility: CLINIC | Age: 86
End: 2021-07-14
Payer: MEDICARE

## 2021-07-14 PROCEDURE — 93306 TTE W/DOPPLER COMPLETE: CPT

## 2021-07-26 ENCOUNTER — NON-APPOINTMENT (OUTPATIENT)
Age: 86
End: 2021-07-26

## 2021-07-26 ENCOUNTER — APPOINTMENT (OUTPATIENT)
Dept: CARDIOLOGY | Facility: CLINIC | Age: 86
End: 2021-07-26
Payer: MEDICARE

## 2021-07-26 VITALS
WEIGHT: 158 LBS | BODY MASS INDEX: 31.85 KG/M2 | SYSTOLIC BLOOD PRESSURE: 109 MMHG | DIASTOLIC BLOOD PRESSURE: 69 MMHG | RESPIRATION RATE: 16 BRPM | HEART RATE: 73 BPM | HEIGHT: 59 IN

## 2021-07-26 DIAGNOSIS — Z87.898 PERSONAL HISTORY OF OTHER SPECIFIED CONDITIONS: ICD-10-CM

## 2021-07-26 PROCEDURE — 93000 ELECTROCARDIOGRAM COMPLETE: CPT

## 2021-07-26 PROCEDURE — 99214 OFFICE O/P EST MOD 30 MIN: CPT

## 2021-07-26 RX ORDER — CHROMIUM 200 MCG
25 MCG TABLET ORAL
Qty: 90 | Refills: 0 | Status: DISCONTINUED | COMMUNITY
Start: 2020-07-20 | End: 2021-07-26

## 2021-07-26 RX ORDER — LEVOTHYROXINE SODIUM 50 UG/1
50 TABLET ORAL
Refills: 0 | Status: DISCONTINUED | COMMUNITY
End: 2021-07-26

## 2021-07-26 RX ORDER — METOPROLOL SUCCINATE 25 MG/1
25 TABLET, EXTENDED RELEASE ORAL DAILY
Refills: 0 | Status: DISCONTINUED | COMMUNITY
End: 2021-07-26

## 2021-07-26 RX ORDER — METOLAZONE 5 MG/1
5 TABLET ORAL
Qty: 30 | Refills: 0 | Status: DISCONTINUED | COMMUNITY
Start: 2021-07-13 | End: 2021-07-26

## 2021-07-26 RX ORDER — RIVASTIGMINE 9.5 MG/24H
9.5 PATCH, EXTENDED RELEASE TRANSDERMAL DAILY
Qty: 30 | Refills: 0 | Status: ACTIVE | COMMUNITY
Start: 2020-07-10

## 2021-07-26 RX ORDER — CHOLECALCIFEROL (VITAMIN D3) 25 MCG
25 MCG CAPSULE ORAL
Qty: 30 | Refills: 0 | Status: ACTIVE | COMMUNITY
Start: 2021-05-22

## 2021-07-26 RX ORDER — MAGNESIUM OXIDE 241.3 MG/1000MG
400 TABLET ORAL DAILY
Refills: 0 | Status: ACTIVE | COMMUNITY

## 2021-07-26 RX ORDER — DIVALPROEX SODIUM 250 MG/1
250 TABLET, DELAYED RELEASE ORAL TWICE DAILY
Refills: 0 | Status: DISCONTINUED | COMMUNITY
Start: 2020-09-24 | End: 2021-07-26

## 2021-07-26 RX ORDER — LEVOTHYROXINE SODIUM 0.05 MG/1
50 TABLET ORAL DAILY
Refills: 0 | Status: ACTIVE | COMMUNITY

## 2021-07-26 RX ORDER — PANTOPRAZOLE 40 MG/1
40 TABLET, DELAYED RELEASE ORAL DAILY
Qty: 90 | Refills: 0 | Status: ACTIVE | COMMUNITY

## 2021-07-26 RX ORDER — ASPIRIN 500 MG
325 TABLET ORAL
Qty: 90 | Refills: 0 | Status: DISCONTINUED | COMMUNITY
Start: 2020-09-28 | End: 2021-07-26

## 2021-07-26 RX ORDER — MECLIZINE HYDROCHLORIDE 12.5 MG/1
12.5 TABLET ORAL 3 TIMES DAILY
Qty: 30 | Refills: 3 | Status: DISCONTINUED | COMMUNITY
Start: 2019-09-04 | End: 2021-07-26

## 2021-07-26 RX ORDER — FOLIC ACID 20 MG
CAPSULE ORAL DAILY
Refills: 0 | Status: ACTIVE | COMMUNITY

## 2021-07-26 NOTE — DISCUSSION/SUMMARY
[FreeTextEntry1] : 1 - Peripheral edema:  bilateral LE edema has improved.  Will continue with current diuretic therapy.  Low sodium diet.  Advised patient to try increasing her walking as her son states that she does not like to walk and sits all day.  Labs prior to follow up visit.\par \par 2 - Echocardiogram (7/14/2021):  EF 60-65%.  mildly dilated left atrium.  Trace mitral valve regurgitation.  Trace pulmonic valve regurgitation.  In comparison to prior studies there is no significant interval change.\par \par 3 - Follow up with Dr. Taylor in 6-8 weeks.

## 2021-07-26 NOTE — CARDIOLOGY SUMMARY
[de-identified] : Sinus rhythm at 79 bpm.  PVC.  Low voltage in precordial leads.  No acute ischemic changes.

## 2021-07-26 NOTE — PHYSICAL EXAM
[Well Developed] : well developed [Well Nourished] : well nourished [No Acute Distress] : no acute distress [Obese] : obese [Normal Conjunctiva] : normal conjunctiva [Normal Venous Pressure] : normal venous pressure [No Carotid Bruit] : no carotid bruit [Normal S1, S2] : normal S1, S2 [No Rub] : no rub [No Gallop] : no gallop [No Respiratory Distress] : no respiratory distress  [Soft] : abdomen soft [Normal Bowel Sounds] : normal bowel sounds [No Rash] : no rash [No Skin Lesions] : no skin lesions [Moves all extremities] : moves all extremities [No Focal Deficits] : no focal deficits [Normal Speech] : normal speech [Alert and Oriented] : alert and oriented [Normal memory] : normal memory [Clear Lung Fields] : clear lung fields [de-identified] : I-II/VI systolic murmur [de-identified] : in wheelchair [de-identified] : trace to 1+ bilateral pedal edema

## 2021-07-26 NOTE — REVIEW OF SYSTEMS
[Lower Ext Edema] : lower extremity edema [Negative] : Heme/Lymph [Weight Loss (___ Lbs)] : [unfilled] ~Ulb weight loss

## 2021-07-26 NOTE — HISTORY OF PRESENT ILLNESS
[FreeTextEntry1] : Mrs. Mcnamara presents today without complaints of chest pain, palpitations, lightheadedness or syncope.  She does have mild shortness of breath.  Recently seen by pulmonary.  Wearing O2 at 2L.  Not as compliant with diet and low sodium intake as she should be.  Son states her lower extremity has improved slightly and Mrs. Mcnamara does not do much walking.

## 2021-09-16 ENCOUNTER — APPOINTMENT (OUTPATIENT)
Dept: CARDIOLOGY | Facility: CLINIC | Age: 86
End: 2021-09-16
Payer: MEDICARE

## 2021-09-16 VITALS
DIASTOLIC BLOOD PRESSURE: 69 MMHG | WEIGHT: 158 LBS | RESPIRATION RATE: 16 BRPM | SYSTOLIC BLOOD PRESSURE: 111 MMHG | BODY MASS INDEX: 31.85 KG/M2 | HEIGHT: 59 IN | HEART RATE: 74 BPM

## 2021-09-16 DIAGNOSIS — Z86.73 PERSONAL HISTORY OF TRANSIENT ISCHEMIC ATTACK (TIA), AND CEREBRAL INFARCTION W/OUT RESIDUAL DEFICITS: ICD-10-CM

## 2021-09-16 PROCEDURE — 93000 ELECTROCARDIOGRAM COMPLETE: CPT

## 2021-09-16 PROCEDURE — 99214 OFFICE O/P EST MOD 30 MIN: CPT

## 2021-09-17 PROBLEM — Z86.73 HISTORY OF TRANSIENT CEREBRAL ISCHEMIA: Status: RESOLVED | Noted: 2017-12-12 | Resolved: 2021-09-17

## 2021-10-14 NOTE — HISTORY OF PRESENT ILLNESS
[FreeTextEntry1] : From a cardiac standpoint, Mrs. Mcnamara remains reasonably stable, although wheelchair-bound.  She denies chest pain, shortness of breath, or other cardiac symptoms.  Her big complaint is that of headache which appears to be a frontal phenomenon and possibly secondary to underlying sinusitis. 
WDL

## 2021-10-14 NOTE — PHYSICAL EXAM
[General Appearance - Well Developed] : well developed [General Appearance - In No Acute Distress] : no acute distress [Normal Conjunctiva] : the conjunctiva exhibited no abnormalities [Normal Oral Mucosa] : normal oral mucosa [Auscultation Breath Sounds / Voice Sounds] : lungs were clear to auscultation bilaterally [Heart Rate And Rhythm] : heart rate and rhythm were normal [Heart Sounds] : normal S1 and S2 [Bowel Sounds] : normal bowel sounds [Abnormal Walk] : normal gait [Skin Color & Pigmentation] : normal skin color and pigmentation [Skin Turgor] : normal skin turgor [Affect] : the affect was normal [Oriented To Time, Place, And Person] : oriented to person, place, and time [Mood] : the mood was normal [FreeTextEntry1] : 1-2+ edema B/L

## 2021-10-14 NOTE — REASON FOR VISIT
[FreeTextEntry1] : Mrs. Mcnamara is a pleasant 89-year-old white female with a past medical history significant for hypertension, hyperlipidemia, congestive heart failure secondary to diastolic dysfunction, TIAs, a recent bout of pneumonia necessitating hospitalization, and suspected DVT, for which she was placed on Eliquis. \par \par

## 2021-10-14 NOTE — ASSESSMENT
[FreeTextEntry1] : 1.  EKG today reveals sinus rhythm at 78 bpm.  One PVC.  Normal intervals.  Poor R-wave progression across precordial leads.  No ischemic changes.  \par \par 2.  Hypertension:  Blood pressure well controlled at this time on current medications.  Patient advised to continue a low-salt diet.  Reduction in caloric intake also advised in order to lose weight. \par \par 3.  Hyperlipidemia:  Review of recent bloodwork performed through her PCP’s office reveals a total cholesterol of 150, HDL 39, TC/HDL ratio 3.8, LDL 55, triglycerides 281.  This is noted in conjunction with a fasting glucose of 210 and a hemoglobin A1C of 6.6.  Patient is advised to continue current medications and follow a low-fat / low-cholesterol diet.  In terms of her diabetes, she is advised on a strict low-carbohydrate intake as well as follow up with her PCP regarding formal treatment. \par \par 4.  DVT:  Currently in Eliquis therapy 5 mg b.i.d.  Will be seeing vascular in the near future and may come off drug, however if not, may need to assess the possibility of a dose reduction given patient’s age and creatinine 1.29.  Patient to undergo fasting bloodwork prior to her next office visit in three months.    \par

## 2022-03-09 ENCOUNTER — RX RENEWAL (OUTPATIENT)
Age: 87
End: 2022-03-09

## 2022-03-28 ENCOUNTER — APPOINTMENT (OUTPATIENT)
Dept: CARDIOLOGY | Facility: CLINIC | Age: 87
End: 2022-03-28

## 2022-06-16 ENCOUNTER — RX RENEWAL (OUTPATIENT)
Age: 87
End: 2022-06-16

## 2022-07-05 ENCOUNTER — RX RENEWAL (OUTPATIENT)
Age: 87
End: 2022-07-05

## 2022-12-27 ENCOUNTER — RX RENEWAL (OUTPATIENT)
Age: 87
End: 2022-12-27

## 2022-12-28 ENCOUNTER — RX RENEWAL (OUTPATIENT)
Age: 87
End: 2022-12-28

## 2023-03-14 ENCOUNTER — RX RENEWAL (OUTPATIENT)
Age: 88
End: 2023-03-14

## 2023-03-31 ENCOUNTER — OFFICE (OUTPATIENT)
Dept: URBAN - METROPOLITAN AREA CLINIC 104 | Facility: CLINIC | Age: 88
Setting detail: OPHTHALMOLOGY
End: 2023-03-31
Payer: MEDICARE

## 2023-03-31 DIAGNOSIS — H01.015: ICD-10-CM

## 2023-03-31 DIAGNOSIS — H16.223: ICD-10-CM

## 2023-03-31 DIAGNOSIS — H01.011: ICD-10-CM

## 2023-03-31 DIAGNOSIS — H01.012: ICD-10-CM

## 2023-03-31 DIAGNOSIS — H52.4: ICD-10-CM

## 2023-03-31 DIAGNOSIS — H35.033: ICD-10-CM

## 2023-03-31 DIAGNOSIS — H01.014: ICD-10-CM

## 2023-03-31 PROBLEM — H11.32 SUBCONJUNCTIVAL HEMORRHAGE; LEFT EYE: Status: RESOLVED | Noted: 2023-03-31 | Resolved: 2023-03-31

## 2023-03-31 PROCEDURE — 92015 DETERMINE REFRACTIVE STATE: CPT | Performed by: OPHTHALMOLOGY

## 2023-03-31 PROCEDURE — 92004 COMPRE OPH EXAM NEW PT 1/>: CPT | Performed by: OPHTHALMOLOGY

## 2023-03-31 ASSESSMENT — REFRACTION_MANIFEST
OD_VA1: 20/30+
OD_AXIS: 110
OS_AXIS: 085
OS_VA1: 20/40+
OD_CYLINDER: -1.00
OS_SPHERE: +0.75
OS_ADD: +2.50
OD_SPHERE: -0.25
OD_ADD: +2.50
OS_CYLINDER: -0.50

## 2023-03-31 ASSESSMENT — REFRACTION_AUTOREFRACTION
OS_SPHERE: +1.50
OS_AXIS: 084
OD_CYLINDER: -1.00
OS_CYLINDER: -2.50
OD_SPHERE: -0.25
OD_AXIS: 111

## 2023-03-31 ASSESSMENT — REFRACTION_CURRENTRX
OD_SPHERE: +0.25
OS_OVR_VA: 20/
OD_OVR_VA: 20/
OS_ADD: +2.75
OD_ADD: +2.75
OD_CYLINDER: SPHERE
OS_CYLINDER: SPHERE
OS_SPHERE: +0.50

## 2023-03-31 ASSESSMENT — SUPERFICIAL PUNCTATE KERATITIS (SPK)
OD_SPK: 2+
OS_SPK: 2+

## 2023-03-31 ASSESSMENT — CONFRONTATIONAL VISUAL FIELD TEST (CVF)
OS_FINDINGS: FULL
OD_FINDINGS: FULL

## 2023-03-31 ASSESSMENT — TONOMETRY
OD_IOP_MMHG: 14
OS_IOP_MMHG: 14

## 2023-03-31 ASSESSMENT — LID EXAM ASSESSMENTS
OS_BLEPHARITIS: +2
OD_BLEPHARITIS: +2

## 2023-03-31 ASSESSMENT — SPHEQUIV_DERIVED
OS_SPHEQUIV: 0.5
OD_SPHEQUIV: -0.75
OD_SPHEQUIV: -0.75
OS_SPHEQUIV: 0.25

## 2023-03-31 ASSESSMENT — VISUAL ACUITY
OS_BCVA: 20/40-2
OD_BCVA: 20/40-1

## 2023-04-06 ENCOUNTER — APPOINTMENT (OUTPATIENT)
Dept: CARDIOLOGY | Facility: CLINIC | Age: 88
End: 2023-04-06
Payer: MEDICARE

## 2023-04-06 VITALS
HEIGHT: 59 IN | BODY MASS INDEX: 29.84 KG/M2 | DIASTOLIC BLOOD PRESSURE: 50 MMHG | WEIGHT: 148 LBS | RESPIRATION RATE: 15 BRPM | OXYGEN SATURATION: 92 % | SYSTOLIC BLOOD PRESSURE: 92 MMHG | HEART RATE: 90 BPM

## 2023-04-06 PROCEDURE — 99214 OFFICE O/P EST MOD 30 MIN: CPT | Mod: 25

## 2023-04-06 PROCEDURE — 93000 ELECTROCARDIOGRAM COMPLETE: CPT

## 2023-04-06 RX ORDER — APIXABAN 5 MG/1
5 TABLET, FILM COATED ORAL
Qty: 60 | Refills: 1 | Status: DISCONTINUED | COMMUNITY
Start: 2021-07-24 | End: 2023-04-06

## 2023-04-06 RX ORDER — ASPIRIN 81 MG
81 TABLET, DELAYED RELEASE (ENTERIC COATED) ORAL
Refills: 0 | Status: ACTIVE | COMMUNITY

## 2023-04-06 RX ORDER — MEMANTINE HYDROCHLORIDE 28 MG/1
28 CAPSULE, EXTENDED RELEASE ORAL
Qty: 90 | Refills: 0 | Status: ACTIVE | COMMUNITY
Start: 2020-12-14

## 2023-04-06 RX ORDER — FUROSEMIDE 40 MG/1
40 TABLET ORAL
Qty: 30 | Refills: 0 | Status: DISCONTINUED | COMMUNITY
Start: 2022-06-16 | End: 2023-04-06

## 2023-04-06 RX ORDER — UBIDECARENONE/VIT E ACET 100MG-5
CAPSULE ORAL
Refills: 0 | Status: ACTIVE | COMMUNITY

## 2023-04-06 RX ORDER — SPIRONOLACTONE 25 MG/1
25 TABLET ORAL
Qty: 30 | Refills: 0 | Status: DISCONTINUED | COMMUNITY
Start: 2022-07-05 | End: 2023-04-06

## 2023-04-06 RX ORDER — LEVETIRACETAM 250 MG/1
250 TABLET, FILM COATED ORAL
Refills: 0 | Status: ACTIVE | COMMUNITY

## 2023-04-06 RX ORDER — LEVETIRACETAM 500 MG/1
500 TABLET, FILM COATED ORAL
Refills: 0 | Status: DISCONTINUED | COMMUNITY
End: 2023-04-06

## 2023-04-06 RX ORDER — POLYETHYLENE GLYCOL 3350 17 G/17G
POWDER, FOR SOLUTION ORAL
Refills: 0 | Status: ACTIVE | COMMUNITY

## 2023-04-06 RX ORDER — METOPROLOL TARTRATE 25 MG/1
25 TABLET, FILM COATED ORAL
Refills: 0 | Status: DISCONTINUED | COMMUNITY
End: 2023-04-06

## 2023-04-06 RX ORDER — ACETAMINOPHEN 325 MG/1
325 TABLET ORAL
Refills: 0 | Status: ACTIVE | COMMUNITY

## 2023-04-06 RX ORDER — MIRTAZAPINE 7.5 MG/1
7.5 TABLET, FILM COATED ORAL
Refills: 0 | Status: DISCONTINUED | COMMUNITY
End: 2023-04-06

## 2023-04-06 NOTE — CARDIOLOGY SUMMARY
[de-identified] : Sinus rhythm at 90 bpm.  PVCs.  Poor R-wave progression in V1-V3.  No acute ischemic changes.

## 2023-04-06 NOTE — DISCUSSION/SUMMARY
[EKG obtained to assist in diagnosis and management of assessed problem(s)] : EKG obtained to assist in diagnosis and management of assessed problem(s) [FreeTextEntry1] : 1 - Hypertension:  blood pressure on the lower side today.  Feeling well.  Denies lightheadedness or fatigue.  \par \par 2 - Hyperlipidemia:  no recent labs.  Patient to have fasting blood work through PCP office.  Follow low fat, low cholesterol diet.\par \par 3 - DVT:  Patient had Eliquis discontinued by vascular approximately one year ago.\par \par 4 - Echocardiogram ([facility] 3/5/2023):  EF 60-65%.  There is trace valvular aortic regurgitation.  There is mild (1+) mitral valve regurgitation.  A trivial pericardial effusion is present.\par \par 5 - Follow up with Dr. Taylor in 3 months.

## 2023-04-06 NOTE — PHYSICAL EXAM
[Well Developed] : well developed [Well Nourished] : well nourished [No Acute Distress] : no acute distress [Normal Conjunctiva] : normal conjunctiva [Normal Venous Pressure] : normal venous pressure [No Carotid Bruit] : no carotid bruit [Normal S1, S2] : normal S1, S2 [No Murmur] : no murmur [No Rub] : no rub [S4] : S4 [Clear Lung Fields] : clear lung fields [No Respiratory Distress] : no respiratory distress  [Soft] : abdomen soft [Normal Bowel Sounds] : normal bowel sounds [Normal Gait] : normal gait [No Rash] : no rash [No Skin Lesions] : no skin lesions [Moves all extremities] : moves all extremities [No Focal Deficits] : no focal deficits [Normal Speech] : normal speech [Alert and Oriented] : alert and oriented [Normal memory] : normal memory [Obese] : obese [de-identified] : Trace bilateral LE edema

## 2023-04-06 NOTE — HISTORY OF PRESENT ILLNESS
[FreeTextEntry1] : Mrs. Mcnamara presents today after not being seen in our office for a year and a half.  She is status-post a recent [facility] visit for pneumonia.  She was treated with antibiotics and discharged.  She is presently feeling well.  Denies complaints of exertional chest pain, palpitations, lightheadedness or syncope.  States she has occasional shortness of breath with stairs (going down, not up).  Admits that she is not very compliant with her low sodium intake as she enjoys her [place] cheese.  Her family that accompanied her today say that she is not active at all and does not do any walking.

## 2023-04-06 NOTE — REASON FOR VISIT
[FreeTextEntry1] : Mrs. Mcnamara is a pleasant 89-year-old white female with a past medical history significant for hypertension, hyperlipidemia, congestive heart failure secondary to diastolic dysfunction, TIAs, a recent bout of pneumonia necessitating hospitalization, and suspected DVT, for which she was placed on Eliquis, who presents for follow up evaluation.\par \par

## 2023-04-14 ENCOUNTER — NON-APPOINTMENT (OUTPATIENT)
Age: 88
End: 2023-04-14

## 2023-08-07 ENCOUNTER — APPOINTMENT (OUTPATIENT)
Dept: CARDIOLOGY | Facility: CLINIC | Age: 88
End: 2023-08-07

## 2023-09-25 ENCOUNTER — RX RENEWAL (OUTPATIENT)
Age: 88
End: 2023-09-25

## 2023-11-16 ENCOUNTER — APPOINTMENT (OUTPATIENT)
Dept: PULMONOLOGY | Facility: CLINIC | Age: 88
End: 2023-11-16
Payer: MEDICARE

## 2023-11-16 VITALS
BODY MASS INDEX: 28.63 KG/M2 | HEIGHT: 59 IN | RESPIRATION RATE: 16 BRPM | SYSTOLIC BLOOD PRESSURE: 112 MMHG | DIASTOLIC BLOOD PRESSURE: 56 MMHG | WEIGHT: 142 LBS

## 2023-11-16 VITALS — HEART RATE: 93 BPM | OXYGEN SATURATION: 90 %

## 2023-11-16 DIAGNOSIS — J96.11 CHRONIC RESPIRATORY FAILURE WITH HYPOXIA: ICD-10-CM

## 2023-11-16 DIAGNOSIS — R06.09 OTHER FORMS OF DYSPNEA: ICD-10-CM

## 2023-11-16 PROCEDURE — 99203 OFFICE O/P NEW LOW 30 MIN: CPT

## 2023-11-17 ENCOUNTER — NON-APPOINTMENT (OUTPATIENT)
Age: 88
End: 2023-11-17

## 2023-11-17 ENCOUNTER — APPOINTMENT (OUTPATIENT)
Dept: CARDIOLOGY | Facility: CLINIC | Age: 88
End: 2023-11-17
Payer: MEDICARE

## 2023-11-17 VITALS
WEIGHT: 140 LBS | RESPIRATION RATE: 15 BRPM | BODY MASS INDEX: 28.22 KG/M2 | OXYGEN SATURATION: 89 % | DIASTOLIC BLOOD PRESSURE: 69 MMHG | SYSTOLIC BLOOD PRESSURE: 115 MMHG | HEIGHT: 59 IN | HEART RATE: 84 BPM

## 2023-11-17 DIAGNOSIS — I50.9 HEART FAILURE, UNSPECIFIED: ICD-10-CM

## 2023-11-17 DIAGNOSIS — R06.89 OTHER ABNORMALITIES OF BREATHING: ICD-10-CM

## 2023-11-17 PROCEDURE — 99215 OFFICE O/P EST HI 40 MIN: CPT | Mod: 25

## 2023-11-17 PROCEDURE — 93000 ELECTROCARDIOGRAM COMPLETE: CPT

## 2023-11-17 RX ORDER — PREDNISONE 20 MG/1
20 TABLET ORAL
Refills: 0 | Status: DISCONTINUED | COMMUNITY

## 2023-11-17 RX ORDER — RANOLAZINE 500 MG/1
500 TABLET, EXTENDED RELEASE ORAL
Qty: 90 | Refills: 2 | Status: DISCONTINUED | COMMUNITY
Start: 2021-03-15 | End: 2023-11-17

## 2023-12-07 PROBLEM — J96.11 CHRONIC RESPIRATORY FAILURE WITH HYPOXIA: Status: ACTIVE | Noted: 2023-12-07

## 2024-01-19 ENCOUNTER — APPOINTMENT (OUTPATIENT)
Dept: PULMONOLOGY | Facility: CLINIC | Age: 89
End: 2024-01-19

## 2024-06-17 ENCOUNTER — NON-APPOINTMENT (OUTPATIENT)
Age: 89
End: 2024-06-17

## 2024-06-17 ENCOUNTER — APPOINTMENT (OUTPATIENT)
Dept: CARDIOLOGY | Facility: CLINIC | Age: 89
End: 2024-06-17
Payer: MEDICARE

## 2024-06-17 VITALS
WEIGHT: 143 LBS | SYSTOLIC BLOOD PRESSURE: 142 MMHG | HEART RATE: 86 BPM | BODY MASS INDEX: 28.83 KG/M2 | DIASTOLIC BLOOD PRESSURE: 80 MMHG | RESPIRATION RATE: 16 BRPM | HEIGHT: 59 IN

## 2024-06-17 DIAGNOSIS — J44.9 CHRONIC OBSTRUCTIVE PULMONARY DISEASE, UNSPECIFIED: ICD-10-CM

## 2024-06-17 DIAGNOSIS — E11.9 TYPE 2 DIABETES MELLITUS W/OUT COMPLICATIONS: ICD-10-CM

## 2024-06-17 DIAGNOSIS — R60.0 LOCALIZED EDEMA: ICD-10-CM

## 2024-06-17 PROCEDURE — 93000 ELECTROCARDIOGRAM COMPLETE: CPT

## 2024-06-17 PROCEDURE — 99214 OFFICE O/P EST MOD 30 MIN: CPT

## 2024-06-17 RX ORDER — RANOLAZINE 500 MG/1
500 TABLET, EXTENDED RELEASE ORAL
Qty: 180 | Refills: 3 | Status: ACTIVE | COMMUNITY
Start: 1900-01-01 | End: 1900-01-01

## 2024-06-17 RX ORDER — FUROSEMIDE 20 MG/1
20 TABLET ORAL DAILY
Qty: 90 | Refills: 3 | Status: ACTIVE | COMMUNITY
Start: 1900-01-01 | End: 1900-01-01

## 2024-06-17 NOTE — CARDIOLOGY SUMMARY
[de-identified] : Sinus rhythm at 85 bpm.  Poor R-wave progression in V1-V3.  No acute ischemic changes.

## 2024-06-17 NOTE — PHYSICAL EXAM
[Well Developed] : well developed [No Acute Distress] : no acute distress [Normal Conjunctiva] : normal conjunctiva [Normal Venous Pressure] : normal venous pressure [No Carotid Bruit] : no carotid bruit [Normal S1, S2] : normal S1, S2 [No Murmur] : no murmur [No Rub] : no rub [S4] : S4 [Clear Lung Fields] : clear lung fields [No Respiratory Distress] : no respiratory distress  [Soft] : abdomen soft [Normal Gait] : normal gait [No Edema] : no edema [No Rash] : no rash [Moves all extremities] : moves all extremities [No Focal Deficits] : no focal deficits [Normal Speech] : normal speech [Alert and Oriented] : alert and oriented [Normal memory] : normal memory [de-identified] : Borderline obese [de-identified] : Uses walker

## 2024-06-17 NOTE — HISTORY OF PRESENT ILLNESS
[FreeTextEntry1] : Mrs. Mcnamara presents today without complaints of exertional chest pain, shortness of breath, palpitations, lightheadedness, or syncope.

## 2024-06-17 NOTE — REASON FOR VISIT
[FreeTextEntry1] : Mrs. Mcnamara is a pleasant 89-year-old white female with a past medical history significant for hypertension, hyperlipidemia, congestive heart failure secondary to diastolic dysfunction, TIAs, a recent bout of pneumonia necessitating hospitalization, and suspected DVT, for which she was placed on Eliquis, who presents for follow up evaluation.

## 2024-06-17 NOTE — DISCUSSION/SUMMARY
[FreeTextEntry1] : 1 - Hypertension:  blood pressure borderline controlled today.   Patient has not taken her morning medications yet.  Advised to follow low sodium diet and work on weight loss.  2 - Hyperlipidemia:  no recent labs.  Follow low fat, low cholesterol, low carb diet.  Fasting blood work prior to follow up visit.  3 - Valvular heart disease:  patient with known mild mitral valve regurgitation and trace aortic regurgitation.  Clinically stable.  Denies chest pain, shortness of breath, palpitations, lightheadedness or syncope.  4 - Follow up with Dr. Taylor in 6 months. [EKG obtained to assist in diagnosis and management of assessed problem(s)] : EKG obtained to assist in diagnosis and management of assessed problem(s)

## 2024-06-21 LAB — A1CG - A1C WITH ESTIMATED AVERAGE GLUCOSE: 6.2

## 2024-07-29 ENCOUNTER — OFFICE (OUTPATIENT)
Dept: URBAN - METROPOLITAN AREA CLINIC 104 | Facility: CLINIC | Age: 89
Setting detail: OPHTHALMOLOGY
End: 2024-07-29
Payer: MEDICARE

## 2024-07-29 ENCOUNTER — RX ONLY (RX ONLY)
Age: 89
End: 2024-07-29

## 2024-07-29 DIAGNOSIS — H01.002: ICD-10-CM

## 2024-07-29 DIAGNOSIS — H01.001: ICD-10-CM

## 2024-07-29 DIAGNOSIS — H35.033: ICD-10-CM

## 2024-07-29 DIAGNOSIS — H01.005: ICD-10-CM

## 2024-07-29 DIAGNOSIS — H01.004: ICD-10-CM

## 2024-07-29 DIAGNOSIS — H11.041: ICD-10-CM

## 2024-07-29 DIAGNOSIS — H16.223: ICD-10-CM

## 2024-07-29 DIAGNOSIS — Z96.1: ICD-10-CM

## 2024-07-29 PROCEDURE — 99213 OFFICE O/P EST LOW 20 MIN: CPT | Performed by: OPHTHALMOLOGY

## 2024-07-29 ASSESSMENT — CONFRONTATIONAL VISUAL FIELD TEST (CVF)
OD_FINDINGS: FULL
OS_FINDINGS: FULL

## 2024-07-29 ASSESSMENT — LID EXAM ASSESSMENTS
OD_BLEPHARITIS: RLL RUL 3+
OS_BLEPHARITIS: LLL LUL 3+

## 2024-12-07 ENCOUNTER — OFFICE (OUTPATIENT)
Dept: URBAN - METROPOLITAN AREA CLINIC 104 | Facility: CLINIC | Age: 89
Setting detail: OPHTHALMOLOGY
End: 2024-12-07
Payer: COMMERCIAL

## 2024-12-07 DIAGNOSIS — H01.004: ICD-10-CM

## 2024-12-07 DIAGNOSIS — H01.005: ICD-10-CM

## 2024-12-07 DIAGNOSIS — H01.001: ICD-10-CM

## 2024-12-07 DIAGNOSIS — H01.002: ICD-10-CM

## 2024-12-07 PROCEDURE — 99213 OFFICE O/P EST LOW 20 MIN: CPT | Performed by: OPTOMETRIST

## 2024-12-07 ASSESSMENT — KERATOMETRY
OS_K2POWER_DIOPTERS: 45.55
OD_AXISANGLE_DEGREES: 002
OS_K1POWER_DIOPTERS: 44.12
OS_AXISANGLE_DEGREES: 006
OD_K2POWER_DIOPTERS: 45.61
OD_K1POWER_DIOPTERS: 44.70

## 2024-12-07 ASSESSMENT — REFRACTION_MANIFEST
OD_AXIS: 110
OD_SPHERE: -0.25
OD_VA1: 20/30+
OD_ADD: +2.50
OS_AXIS: 085
OS_SPHERE: +0.75
OS_VA1: 20/40+
OS_ADD: +2.50
OD_CYLINDER: -1.00
OS_CYLINDER: -0.50

## 2024-12-07 ASSESSMENT — VISUAL ACUITY
OS_BCVA: 20/40
OD_BCVA: 20/70

## 2024-12-07 ASSESSMENT — REFRACTION_CURRENTRX
OS_OVR_VA: 20/
OD_CYLINDER: SPHERE
OD_OVR_VA: 20/
OS_ADD: +2.75
OD_SPHERE: +0.25
OS_CYLINDER: SPHERE
OS_SPHERE: +0.50
OD_ADD: +2.75

## 2024-12-07 ASSESSMENT — LID EXAM ASSESSMENTS
OD_BLEPHARITIS: RLL RUL 3+
OS_BLEPHARITIS: LLL LUL 3+

## 2024-12-07 ASSESSMENT — CONFRONTATIONAL VISUAL FIELD TEST (CVF)
OD_FINDINGS: FULL
OS_FINDINGS: FULL

## 2024-12-07 ASSESSMENT — SUPERFICIAL PUNCTATE KERATITIS (SPK)
OS_SPK: 2+
OD_SPK: 3+

## 2024-12-07 ASSESSMENT — REFRACTION_AUTOREFRACTION
OS_AXIS: 090
OD_SPHERE: -0.25
OD_AXIS: 100
OS_CYLINDER: -2.25
OS_SPHERE: +1.00
OD_CYLINDER: -0.50

## 2024-12-07 ASSESSMENT — CORNEAL PTERYGIUM: OD_PTERYGIUM: NASAL 1MM

## 2024-12-09 ENCOUNTER — APPOINTMENT (OUTPATIENT)
Dept: CARDIOLOGY | Facility: CLINIC | Age: 88
End: 2024-12-09
Payer: MEDICARE

## 2024-12-09 VITALS
RESPIRATION RATE: 16 BRPM | SYSTOLIC BLOOD PRESSURE: 120 MMHG | DIASTOLIC BLOOD PRESSURE: 80 MMHG | HEART RATE: 52 BPM | BODY MASS INDEX: 28.22 KG/M2 | HEIGHT: 59 IN | WEIGHT: 140 LBS

## 2024-12-09 DIAGNOSIS — R06.09 OTHER FORMS OF DYSPNEA: ICD-10-CM

## 2024-12-09 DIAGNOSIS — J44.9 CHRONIC OBSTRUCTIVE PULMONARY DISEASE, UNSPECIFIED: ICD-10-CM

## 2024-12-09 DIAGNOSIS — I50.9 HEART FAILURE, UNSPECIFIED: ICD-10-CM

## 2024-12-09 DIAGNOSIS — R60.0 LOCALIZED EDEMA: ICD-10-CM

## 2024-12-09 PROCEDURE — 99213 OFFICE O/P EST LOW 20 MIN: CPT

## 2024-12-09 PROCEDURE — G2211 COMPLEX E/M VISIT ADD ON: CPT

## 2024-12-09 PROCEDURE — 93000 ELECTROCARDIOGRAM COMPLETE: CPT

## 2024-12-24 PROBLEM — F10.90 ALCOHOL USE: Status: INACTIVE | Noted: 2017-12-12

## 2025-01-29 ENCOUNTER — APPOINTMENT (OUTPATIENT)
Dept: PULMONOLOGY | Facility: CLINIC | Age: 89
End: 2025-01-29
Payer: MEDICARE

## 2025-01-29 VITALS
SYSTOLIC BLOOD PRESSURE: 112 MMHG | WEIGHT: 137 LBS | DIASTOLIC BLOOD PRESSURE: 58 MMHG | BODY MASS INDEX: 27.62 KG/M2 | HEART RATE: 91 BPM | OXYGEN SATURATION: 98 % | HEIGHT: 59 IN | RESPIRATION RATE: 15 BRPM

## 2025-01-29 DIAGNOSIS — J96.11 CHRONIC RESPIRATORY FAILURE WITH HYPOXIA: ICD-10-CM

## 2025-01-29 DIAGNOSIS — R06.09 OTHER FORMS OF DYSPNEA: ICD-10-CM

## 2025-01-29 DIAGNOSIS — I50.9 HEART FAILURE, UNSPECIFIED: ICD-10-CM

## 2025-01-29 DIAGNOSIS — J44.9 CHRONIC OBSTRUCTIVE PULMONARY DISEASE, UNSPECIFIED: ICD-10-CM

## 2025-01-29 PROCEDURE — G2211 COMPLEX E/M VISIT ADD ON: CPT

## 2025-01-29 PROCEDURE — 99213 OFFICE O/P EST LOW 20 MIN: CPT

## 2025-06-02 ENCOUNTER — APPOINTMENT (OUTPATIENT)
Dept: CARDIOLOGY | Facility: CLINIC | Age: 89
End: 2025-06-02

## 2025-07-29 ENCOUNTER — APPOINTMENT (OUTPATIENT)
Dept: PULMONOLOGY | Facility: CLINIC | Age: 89
End: 2025-07-29